# Patient Record
Sex: FEMALE | URBAN - NONMETROPOLITAN AREA
[De-identification: names, ages, dates, MRNs, and addresses within clinical notes are randomized per-mention and may not be internally consistent; named-entity substitution may affect disease eponyms.]

---
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
## 2025-05-14 ENCOUNTER — APPOINTMENT (OUTPATIENT)
Dept: URBAN - NONMETROPOLITAN AREA CLINIC 45 | Age: 20
Setting detail: DERMATOLOGY
End: 2025-05-18

## 2025-05-14 DIAGNOSIS — L70.0 ACNE VULGARIS: ICD-10-CM

## 2025-05-14 PROCEDURE — OTHER MIPS QUALITY: OTHER

## 2025-05-14 PROCEDURE — OTHER PRESCRIPTION: OTHER

## 2025-05-14 PROCEDURE — 99214 OFFICE O/P EST MOD 30 MIN: CPT

## 2025-05-14 PROCEDURE — OTHER PRESCRIPTION MEDICATION MANAGEMENT: OTHER

## 2025-05-14 PROCEDURE — OTHER PHOTO-DOCUMENTATION: OTHER

## 2025-05-14 PROCEDURE — OTHER COUNSELING: OTHER

## 2025-05-14 RX ORDER — TRETIONIN 0.5 MG/G
CREAM TOPICAL AS DIRECTED
Qty: 45 | Refills: 11 | Status: ERX | COMMUNITY
Start: 2025-05-14

## 2025-05-14 RX ORDER — SPIRONOLACTONE 50 MG/1
TABLET, FILM COATED ORAL ONCE A DAY
Qty: 30 | Refills: 3 | Status: ERX | COMMUNITY
Start: 2025-05-14

## 2025-05-14 RX ORDER — CLINDAMYCIN PHOSPHATE 10 MG/G
GEL TOPICAL
Qty: 60 | Refills: 3 | Status: ERX

## 2025-05-14 ASSESSMENT — LOCATION SIMPLE DESCRIPTION DERM: LOCATION SIMPLE: LEFT CHEEK

## 2025-05-14 ASSESSMENT — LOCATION DETAILED DESCRIPTION DERM: LOCATION DETAILED: LEFT INFERIOR MEDIAL MALAR CHEEK

## 2025-05-14 ASSESSMENT — LOCATION ZONE DERM: LOCATION ZONE: FACE

## 2025-08-14 ENCOUNTER — APPOINTMENT (OUTPATIENT)
Dept: URBAN - NONMETROPOLITAN AREA CLINIC 45 | Age: 20
Setting detail: DERMATOLOGY
End: 2025-08-17

## 2025-08-14 DIAGNOSIS — L70.0 ACNE VULGARIS: ICD-10-CM

## 2025-08-14 PROCEDURE — OTHER MIPS QUALITY: OTHER

## 2025-08-14 PROCEDURE — OTHER COUNSELING: OTHER

## 2025-08-14 PROCEDURE — OTHER PHOTO-DOCUMENTATION: OTHER

## 2025-08-14 PROCEDURE — 99214 OFFICE O/P EST MOD 30 MIN: CPT

## 2025-08-14 PROCEDURE — OTHER PRESCRIPTION: OTHER

## 2025-08-14 PROCEDURE — OTHER PRESCRIPTION MEDICATION MANAGEMENT: OTHER

## 2025-08-14 RX ORDER — SPIRONOLACTONE 50 MG/1
TABLET, FILM COATED ORAL ONCE A DAY
Qty: 60 | Refills: 3 | Status: ERX

## 2025-08-14 ASSESSMENT — LOCATION ZONE DERM: LOCATION ZONE: FACE

## 2025-08-14 ASSESSMENT — LOCATION SIMPLE DESCRIPTION DERM: LOCATION SIMPLE: LEFT CHEEK

## 2025-08-14 ASSESSMENT — LOCATION DETAILED DESCRIPTION DERM: LOCATION DETAILED: LEFT INFERIOR MEDIAL MALAR CHEEK
